# Patient Record
Sex: MALE | Race: BLACK OR AFRICAN AMERICAN | Employment: FULL TIME | URBAN - METROPOLITAN AREA
[De-identification: names, ages, dates, MRNs, and addresses within clinical notes are randomized per-mention and may not be internally consistent; named-entity substitution may affect disease eponyms.]

---

## 2020-01-11 ENCOUNTER — HOSPITAL ENCOUNTER (EMERGENCY)
Age: 35
Discharge: HOME | End: 2020-01-11

## 2020-01-11 VITALS
OXYGEN SATURATION: 100 % | WEIGHT: 130 LBS | DIASTOLIC BLOOD PRESSURE: 80 MMHG | HEIGHT: 66 IN | SYSTOLIC BLOOD PRESSURE: 136 MMHG | TEMPERATURE: 97.9 F | BODY MASS INDEX: 20.89 KG/M2 | HEART RATE: 88 BPM | RESPIRATION RATE: 16 BRPM

## 2020-01-11 PROCEDURE — 6370000000 HC RX 637 (ALT 250 FOR IP): Performed by: NURSE PRACTITIONER

## 2020-01-11 PROCEDURE — 99282 EMERGENCY DEPT VISIT SF MDM: CPT

## 2020-01-11 RX ORDER — IBUPROFEN 800 MG/1
800 TABLET ORAL ONCE
Status: COMPLETED | OUTPATIENT
Start: 2020-01-11 | End: 2020-01-11

## 2020-01-11 RX ORDER — AMOXICILLIN 500 MG/1
500 CAPSULE ORAL 2 TIMES DAILY
Qty: 14 CAPSULE | Refills: 0 | Status: SHIPPED | OUTPATIENT
Start: 2020-01-11 | End: 2020-01-18

## 2020-01-11 RX ORDER — IBUPROFEN 600 MG/1
TABLET ORAL
Status: DISCONTINUED
Start: 2020-01-11 | End: 2020-01-11 | Stop reason: HOSPADM

## 2020-01-11 RX ORDER — IBUPROFEN 800 MG/1
800 TABLET ORAL EVERY 6 HOURS PRN
Qty: 20 TABLET | Refills: 0 | Status: SHIPPED | OUTPATIENT
Start: 2020-01-11

## 2020-01-11 RX ADMIN — IBUPROFEN 600 MG: 600 TABLET, FILM COATED ORAL at 09:19

## 2020-01-11 ASSESSMENT — PAIN SCALES - GENERAL: PAINLEVEL_OUTOF10: 7

## 2020-01-11 NOTE — ED PROVIDER NOTES
No results found. PROCEDURES   Unless otherwise noted below, none     Procedures    CRITICAL CARE TIME   N/A    CONSULTS:  None      EMERGENCY DEPARTMENT COURSE and DIFFERENTIAL DIAGNOSIS/MDM:   Vitals:    Vitals:    01/11/20 0810   BP: 136/80   Pulse: 88   Resp: 16   Temp: 97.9 °F (36.6 °C)   TempSrc: Infrared   SpO2: 100%   Weight: 130 lb (59 kg)   Height: 5' 6\" (1.676 m)       Patient was given thefollowing medications:  Medications   ibuprofen (ADVIL;MOTRIN) tablet 800 mg (600 mg Oral Given 1/11/20 0919)         Pertinent Labs & Imaging studies reviewed. (See chart for details)   -  Patient seen and evaluated in the emergency department. -  Triage and nursing notes reviewed and incorporated. -  Old chart records reviewed and incorporated. -  Patient case was not discussed with attending physician,  however Dr. Cisco Villegas was available for consultation  -  Differential diagnosis includes:  Dental avulsion, dental caries, versus dental abscess  -  Work-up included:  See above   -  ED treatment included:   Motrin  - Consults: None  -  Results discussed with patient. Pt was given strict return precautions. The patient is agreeable with plan of care and disposition. Dental clinic list is given  -  Disposition:    Home      FINAL IMPRESSION      1. Dental caries    2. Avulsion of tooth, initial encounter    3.  Pain, dental          DISPOSITION/PLAN   DISPOSITION Decision To Discharge 01/11/2020 09:04:11 AM      PATIENT REFERREDTO:  Joe Rogersyandy  [phone]  Call in 2 days  As needed, If symptoms worsen    [facility] Emergency Department  [address] [facility]  [phone]  Go to   As needed      DISCHARGE MEDICATIONS:  Discharge Medication List as of 1/11/2020  9:12 AM      START taking these medications    Details   amoxicillin (AMOXIL) 500 MG capsule Take 1 capsule by mouth 2 times daily for 7 days, Disp-14 capsule, R-0Print      ibuprofen (IBU) 800 MG